# Patient Record
(demographics unavailable — no encounter records)

---

## 2024-10-30 NOTE — ASSESSMENT
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 will check Ab.  Educated on taking LT4 on empty stomach, waiting at least 1/2hr before drinking or eating anything else and to take every "morning" and okay to double up if missed a dose. call in earlier if any symptoms of hypothyroidism No plans for pregnancy.  Goal TSH <2.5 continue LT4 75mcg daily, warned about increased price on brand. given flyer. given samples of synthroid TERE 75mcg daily while meds are pending

## 2024-10-30 NOTE — HISTORY OF PRESENT ILLNESS
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 Paras Wu in [facility] (retired) no hoarseness/dysphagia/dyspnea  thyroid hx: hx of graves' disease s/p 16.9mCi I-131 7/9/2012 and subsequent became hypothyroid thyroid ultrasound: none recent FH of thyroid issue: mom FH of autoimmune disease: none FH of thyroid cancer: none FH of diabetes: none head/neck external beam radiation: none  meds for thyroid: synthroid 75mcg daily, at least 6 weeks, has been consistently takes appropriately, no missing doses  LMP: IUD, regular menses 10/3 4 kids (3 boys 15/13/10, 1 girl 7) 2 dogs (previously has a family of bunnies)